# Patient Record
Sex: MALE | Race: BLACK OR AFRICAN AMERICAN | Employment: FULL TIME | URBAN - METROPOLITAN AREA
[De-identification: names, ages, dates, MRNs, and addresses within clinical notes are randomized per-mention and may not be internally consistent; named-entity substitution may affect disease eponyms.]

---

## 2022-04-26 ENCOUNTER — HOSPITAL ENCOUNTER (EMERGENCY)
Age: 57
Discharge: HOME | End: 2022-04-26
Attending: EMERGENCY MEDICINE
Payer: COMMERCIAL

## 2022-04-26 ENCOUNTER — APPOINTMENT (OUTPATIENT)
Dept: GENERAL RADIOLOGY | Age: 57
End: 2022-04-26
Payer: COMMERCIAL

## 2022-04-26 VITALS
OXYGEN SATURATION: 98 % | DIASTOLIC BLOOD PRESSURE: 92 MMHG | RESPIRATION RATE: 21 BRPM | WEIGHT: 271.83 LBS | HEART RATE: 69 BPM | BODY MASS INDEX: 36.03 KG/M2 | SYSTOLIC BLOOD PRESSURE: 151 MMHG | TEMPERATURE: 97.8 F | HEIGHT: 73 IN

## 2022-04-26 DIAGNOSIS — R00.2 PALPITATION: Primary | ICD-10-CM

## 2022-04-26 LAB
ANION GAP SERPL CALCULATED.3IONS-SCNC: 16 MMOL/L (ref 3–16)
BASOPHILS ABSOLUTE: 0 K/UL (ref 0–0.2)
BASOPHILS RELATIVE PERCENT: 0.9 %
BUN BLDV-MCNC: 20 MG/DL (ref 7–20)
CALCIUM SERPL-MCNC: 9 MG/DL (ref 8.3–10.6)
CHLORIDE BLD-SCNC: 100 MMOL/L (ref 99–110)
CO2: 22 MMOL/L (ref 21–32)
CREAT SERPL-MCNC: 0.8 MG/DL (ref 0.9–1.3)
EOSINOPHILS ABSOLUTE: 0.2 K/UL (ref 0–0.6)
EOSINOPHILS RELATIVE PERCENT: 2.9 %
GFR AFRICAN AMERICAN: >60
GFR NON-AFRICAN AMERICAN: >60
GLUCOSE BLD-MCNC: 125 MG/DL (ref 70–99)
HCT VFR BLD CALC: 44.3 % (ref 40.5–52.5)
HEMOGLOBIN: 14.9 G/DL (ref 13.5–17.5)
LYMPHOCYTES ABSOLUTE: 1.5 K/UL (ref 1–5.1)
LYMPHOCYTES RELATIVE PERCENT: 29.9 %
MAGNESIUM: 2.2 MG/DL (ref 1.8–2.4)
MCH RBC QN AUTO: 29.6 PG (ref 26–34)
MCHC RBC AUTO-ENTMCNC: 33.6 G/DL (ref 31–36)
MCV RBC AUTO: 88.1 FL (ref 80–100)
MONOCYTES ABSOLUTE: 0.5 K/UL (ref 0–1.3)
MONOCYTES RELATIVE PERCENT: 9.1 %
NEUTROPHILS ABSOLUTE: 3 K/UL (ref 1.7–7.7)
NEUTROPHILS RELATIVE PERCENT: 57.2 %
PDW BLD-RTO: 14.3 % (ref 12.4–15.4)
PLATELET # BLD: 211 K/UL (ref 135–450)
PMV BLD AUTO: 8.5 FL (ref 5–10.5)
POTASSIUM REFLEX MAGNESIUM: 3.3 MMOL/L (ref 3.5–5.1)
PRO-BNP: 37 PG/ML (ref 0–124)
RBC # BLD: 5.03 M/UL (ref 4.2–5.9)
SODIUM BLD-SCNC: 138 MMOL/L (ref 136–145)
TROPONIN: <0.01 NG/ML
TSH REFLEX: 2.57 UIU/ML (ref 0.27–4.2)
WBC # BLD: 5.2 K/UL (ref 4–11)

## 2022-04-26 PROCEDURE — 83735 ASSAY OF MAGNESIUM: CPT

## 2022-04-26 PROCEDURE — 84484 ASSAY OF TROPONIN QUANT: CPT

## 2022-04-26 PROCEDURE — 83880 ASSAY OF NATRIURETIC PEPTIDE: CPT

## 2022-04-26 PROCEDURE — 99285 EMERGENCY DEPT VISIT HI MDM: CPT

## 2022-04-26 PROCEDURE — 93005 ELECTROCARDIOGRAM TRACING: CPT | Performed by: EMERGENCY MEDICINE

## 2022-04-26 PROCEDURE — 6370000000 HC RX 637 (ALT 250 FOR IP): Performed by: EMERGENCY MEDICINE

## 2022-04-26 PROCEDURE — 85025 COMPLETE CBC W/AUTO DIFF WBC: CPT

## 2022-04-26 PROCEDURE — 80048 BASIC METABOLIC PNL TOTAL CA: CPT

## 2022-04-26 PROCEDURE — 71045 X-RAY EXAM CHEST 1 VIEW: CPT

## 2022-04-26 PROCEDURE — 84443 ASSAY THYROID STIM HORMONE: CPT

## 2022-04-26 RX ORDER — POTASSIUM CHLORIDE 750 MG/1
40 TABLET, FILM COATED, EXTENDED RELEASE ORAL ONCE
Status: COMPLETED | OUTPATIENT
Start: 2022-04-26 | End: 2022-04-26

## 2022-04-26 RX ADMIN — POTASSIUM CHLORIDE 40 MEQ: 750 TABLET, EXTENDED RELEASE ORAL at 05:38

## 2022-04-26 ASSESSMENT — PAIN - FUNCTIONAL ASSESSMENT: PAIN_FUNCTIONAL_ASSESSMENT: NONE - DENIES PAIN

## 2022-04-26 NOTE — ED TRIAGE NOTES
Pt to ED via EMS after waking up to use the restroom and feeling his heart racing. Pt denies chest pain, dizziness, or SOB. Pt is AxOx4, VSS on RA. Pt's wife at bedside.

## 2022-04-27 LAB
EKG ATRIAL RATE: 85 BPM
EKG DIAGNOSIS: NORMAL
EKG P AXIS: 62 DEGREES
EKG P-R INTERVAL: 134 MS
EKG Q-T INTERVAL: 380 MS
EKG QRS DURATION: 74 MS
EKG QTC CALCULATION (BAZETT): 452 MS
EKG R AXIS: -26 DEGREES
EKG T AXIS: 50 DEGREES
EKG VENTRICULAR RATE: 85 BPM

## 2022-04-27 PROCEDURE — 93010 ELECTROCARDIOGRAM REPORT: CPT | Performed by: INTERNAL MEDICINE

## 2022-04-27 ASSESSMENT — ENCOUNTER SYMPTOMS
CONSTIPATION: 0
DIARRHEA: 0
BLOOD IN STOOL: 0
EYE DISCHARGE: 0
BACK PAIN: 0
EYE REDNESS: 0
ANAL BLEEDING: 0
PHOTOPHOBIA: 0
APNEA: 0
EYE PAIN: 0
CHEST TIGHTNESS: 0
VOMITING: 0
ABDOMINAL DISTENTION: 0
NAUSEA: 0
WHEEZING: 0
SHORTNESS OF BREATH: 0
RECTAL PAIN: 0
STRIDOR: 0
ABDOMINAL PAIN: 0
EYE ITCHING: 0
COLOR CHANGE: 0
COUGH: 0
CHOKING: 0

## 2022-04-27 NOTE — ED PROVIDER NOTES
change and pallor. Neurological: Negative for tremors, facial asymmetry and weakness. Hematological: Negative for adenopathy. Does not bruise/bleed easily. Psychiatric/Behavioral: Negative for agitation, behavioral problems, confusion and decreased concentration. Except as noted above the remainder of the review of systems was reviewed and negative. PAST MEDICAL HISTORY   No past medical history on file. SURGICAL HISTORY     No past surgical history on file. CURRENT MEDICATIONS     There are no discharge medications for this patient. ALLERGIES     Patient has no known allergies. FAMILY HISTORY      No family history on file. SOCIAL HISTORY       Social History     Socioeconomic History    Marital status:      Spouse name: Not on file    Number of children: Not on file    Years of education: Not on file    Highest education level: Not on file   Occupational History    Not on file   Tobacco Use    Smoking status: Not on file    Smokeless tobacco: Not on file   Substance and Sexual Activity    Alcohol use: Not on file    Drug use: Not on file    Sexual activity: Not on file   Other Topics Concern    Not on file   Social History Narrative    Not on file     Social Determinants of Health     Financial Resource Strain:     Difficulty of Paying Living Expenses: Not on file   Food Insecurity:     Worried About Running Out of Food in the Last Year: Not on file    Efren of Food in the Last Year: Not on file   Transportation Needs:     Lack of Transportation (Medical): Not on file    Lack of Transportation (Non-Medical):  Not on file   Physical Activity:     Days of Exercise per Week: Not on file    Minutes of Exercise per Session: Not on file   Stress:     Feeling of Stress : Not on file   Social Connections:     Frequency of Communication with Friends and Family: Not on file    Frequency of Social Gatherings with Friends and Family: Not on file    Attends [employer] Services: Not on file    Active Member of Clubs or Organizations: Not on file    Attends Club or Organization Meetings: Not on file    Marital Status: Not on file   Intimate Partner Violence:     Fear of Current or Ex-Partner: Not on file    Emotionally Abused: Not on file    Physically Abused: Not on file    Sexually Abused: Not on file   Housing Stability:     Unable to Pay for Housing in the Last Year: Not on file    Number of [place] in the Last Year: Not on file    Unstable Housing in the Last Year: Not on file       PHYSICAL EXAM       ED Triage Vitals [04/26/22 0422]   BP Temp Temp Source Pulse Resp SpO2 Height Weight   (!) 158/83 98.2 °F (36.8 °C) Oral 88 17 96 % 6' 1\" (1.854 m) 271 lb 13.2 oz (123.3 kg)       Physical Exam  Vitals and nursing note reviewed. Constitutional:       General: He is not in acute distress. Appearance: He is well-developed. He is not diaphoretic. HENT:      Head: Normocephalic and atraumatic. Eyes:      General:         Right eye: No discharge. Left eye: No discharge. Pupils: Pupils are equal, round, and reactive to light. Neck:      Thyroid: No thyromegaly. Trachea: No tracheal deviation. Cardiovascular:      Rate and Rhythm: Normal rate and regular rhythm. Heart sounds: No murmur heard. Pulmonary:      Breath sounds: No wheezing or rales. Chest:      Chest wall: No tenderness. Abdominal:      General: There is no distension. Palpations: Abdomen is soft. There is no mass. Tenderness: There is no abdominal tenderness. There is no guarding or rebound. Musculoskeletal:         General: No tenderness or deformity. Normal range of motion. Cervical back: Normal range of motion. Skin:     General: Skin is warm. Coloration: Skin is not pale. Findings: No erythema or rash. Neurological:      Mental Status: He is alert. Cranial Nerves: No cranial nerve deficit.       Motor: No abnormal muscle tone. Coordination: Coordination normal.         DIAGNOSTIC RESULTS     EKG: All EKG's are interpreted by the Emergency Department Physician who either signs or Co-signs this chart in the absence of acardiologist.    EKG sinus rhythm nonspecific EKG changes    RADIOLOGY:   Non-plain film images such as CT, Ultrasoundand MRI are read by the radiologist. Plain radiographic images are visualized and preliminarily interpreted by the emergency physician with the below findings:    Chest x-ray reassuring    ED BEDSIDE ULTRASOUND:   Performed by ED Physician - none    LABS:  Labs Reviewed   BASIC METABOLIC PANEL W/ REFLEX TO MG FOR LOW K - Abnormal; Notable for the following components:       Result Value    Potassium reflex Magnesium 3.3 (*)     Glucose 125 (*)     CREATININE 0.8 (*)     All other components within normal limits   CBC WITH AUTO DIFFERENTIAL   TROPONIN   BRAIN NATRIURETIC PEPTIDE   TSH WITH REFLEX   MAGNESIUM       All other labs were withinnormal range or not returned as of this dictation. EMERGENCY DEPARTMENT COURSE and DIFFERENTIAL DIAGNOSIS/MDM:     PMH, Surgical Hx, FH, Social Hx reviewed by myself (ETOH usage, Tobacco usage, Drug usage reviewed by myself, no pertinent Hx)- No Pertinent Hx     Old records were reviewed by me    23-year-old with palpitations unclear etiology. Reassuring work-up. Discussed following up with primary care doctor. Holter monitor ordered outpatient. Strict return precautions. I estimate there is LOW risk for Sepsis, MI, Stroke, Tamponade, PTX, Toxicity or other life threatening etiology thus I consider the discharge disposition reasonable. The patient is at low risk for mortality based on demographic, history and clinical factors. Given the best available information and clinical assessment, I estimate the risk of hospitalization to be greater than risk of treatment at home.  I have explained to the patient that the risk could rapidly change, given precautions for return and instructions. Explained to patient that the risk for mortality is low based on demographic, history and clinical factors. I discussed with patient the results of evaluation in the ED, diagnosis, care, and prognosis. The plan is to discharge to home. Patient is in agreement with plan and questions have been answered. I also discussed with patient the reasons which may require a return visit and the importance of follow-up care. The patient is well-appearing, nontoxic, and improved at the time of discharge. Patient agrees to call to arrange follow-up care as directed. Patient understands to return immediately for worsening/change in symptoms. CRITICAL CARE TIME   Total Critical Caretime was 21 minutes, excluding separately reportable procedures. There was a high probability of clinically significant/life threatening deterioration in the patient's condition which required my urgent intervention. PROCEDURES:  Unlessotherwise noted below, none    FINAL IMPRESSION      1.  Palpitation          DISPOSITION/PLAN   DISPOSITION Decision To Discharge 04/26/2022 05:47:56 AM    (Please note that portions ofthis note were completed with a voice recognition program.  Efforts were made to edit the dictations but occasionally words are mis-transcribed.)    Brynn Ro MD(electronically signed)  Attending Emergency Physician        Brynn Ro MD  04/27/22 0005       Brynn Ro MD  04/27/22 [id number]

## 2022-04-29 ENCOUNTER — HOSPITAL ENCOUNTER (OUTPATIENT)
Dept: NON INVASIVE DIAGNOSTICS | Age: 57
Discharge: HOME | End: 2022-04-29
Payer: COMMERCIAL

## 2022-04-29 DIAGNOSIS — R00.2 PALPITATION: ICD-10-CM

## 2022-04-29 PROCEDURE — 93225 XTRNL ECG REC<48 HRS REC: CPT

## 2022-04-29 PROCEDURE — 93226 XTRNL ECG REC<48 HR SCAN A/R: CPT

## 2022-05-05 LAB
ACQUISITION DURATION: NORMAL S
AVERAGE HEART RATE: 84 BPM
EKG DIAGNOSIS: NORMAL
HOLTER MAX HEART RATE: 141 BPM
HOOKUP DATE: NORMAL
HOOKUP TIME: NORMAL
MAX HEART RATE TIME/DATE: NORMAL
MIN HEART RATE TIME/DATE: NORMAL
MIN HEART RATE: 59 BPM
NUMBER OF QRS COMPLEXES: NORMAL
NUMBER OF SUPRAVENTRICULAR COUPLETS: 0
NUMBER OF SUPRAVENTRICULAR ECTOPICS: NORMAL
NUMBER OF SUPRAVENTRICULAR ISOLATED BEATS: NORMAL
NUMBER OF VENTRICULAR BIGEMINAL CYCLES: 0
NUMBER OF VENTRICULAR COUPLETS: 0
NUMBER OF VENTRICULAR ECTOPICS: 55

## 2024-01-07 ENCOUNTER — HOSPITAL ENCOUNTER (INPATIENT)
Age: 59
LOS: 2 days | Discharge: HOME | DRG: 163 | End: 2024-01-09
Attending: INTERNAL MEDICINE | Admitting: INTERNAL MEDICINE
Payer: COMMERCIAL

## 2024-01-07 ENCOUNTER — APPOINTMENT (OUTPATIENT)
Dept: CT IMAGING | Age: 59
DRG: 163 | End: 2024-01-07
Payer: COMMERCIAL

## 2024-01-07 DIAGNOSIS — I26.92 ACUTE SADDLE PULMONARY EMBOLISM WITHOUT ACUTE COR PULMONALE (HCC): Primary | ICD-10-CM

## 2024-01-07 DIAGNOSIS — I26.94 MULTIPLE SUBSEGMENTAL PULMONARY EMBOLI WITHOUT ACUTE COR PULMONALE (HCC): ICD-10-CM

## 2024-01-07 DIAGNOSIS — R06.00 DYSPNEA, UNSPECIFIED TYPE: ICD-10-CM

## 2024-01-07 DIAGNOSIS — R60.0 LEG EDEMA, RIGHT: ICD-10-CM

## 2024-01-07 DIAGNOSIS — R09.02 HYPOXIA: ICD-10-CM

## 2024-01-07 PROBLEM — J96.01 ACUTE HYPOXIC RESPIRATORY FAILURE (HCC): Status: ACTIVE | Noted: 2024-01-07

## 2024-01-07 PROBLEM — I26.99 BILATERAL PULMONARY EMBOLISM (HCC): Status: ACTIVE | Noted: 2024-01-07

## 2024-01-07 LAB
ALBUMIN SERPL-MCNC: 4.2 G/DL (ref 3.4–5)
ALBUMIN/GLOB SERPL: 1.2 {RATIO} (ref 1.1–2.2)
ALP SERPL-CCNC: 107 U/L (ref 40–129)
ALT SERPL-CCNC: 66 U/L (ref 10–40)
ANION GAP SERPL CALCULATED.3IONS-SCNC: 11 MMOL/L (ref 3–16)
APTT BLD: 27.1 SEC (ref 22.7–35.9)
AST SERPL-CCNC: 50 U/L (ref 15–37)
BASOPHILS # BLD: 0 K/UL (ref 0–0.2)
BASOPHILS NFR BLD: 0.3 %
BILIRUB SERPL-MCNC: 1 MG/DL (ref 0–1)
BUN SERPL-MCNC: 12 MG/DL (ref 7–20)
CALCIUM SERPL-MCNC: 9.3 MG/DL (ref 8.3–10.6)
CHLORIDE SERPL-SCNC: 98 MMOL/L (ref 99–110)
CO2 SERPL-SCNC: 23 MMOL/L (ref 21–32)
CREAT SERPL-MCNC: 0.9 MG/DL (ref 0.9–1.3)
DEPRECATED RDW RBC AUTO: 13.9 % (ref 12.4–15.4)
EOSINOPHIL # BLD: 0.3 K/UL (ref 0–0.6)
EOSINOPHIL NFR BLD: 4.9 %
GFR SERPLBLD CREATININE-BSD FMLA CKD-EPI: >60 ML/MIN/{1.73_M2}
GLUCOSE SERPL-MCNC: 131 MG/DL (ref 70–99)
HCT VFR BLD AUTO: 44.3 % (ref 40.5–52.5)
HGB BLD-MCNC: 15 G/DL (ref 13.5–17.5)
INR PPP: 1.1 (ref 0.84–1.16)
LYMPHOCYTES # BLD: 1.2 K/UL (ref 1–5.1)
LYMPHOCYTES NFR BLD: 18.2 %
MAGNESIUM SERPL-MCNC: 2.1 MG/DL (ref 1.8–2.4)
MCH RBC QN AUTO: 30.1 PG (ref 26–34)
MCHC RBC AUTO-ENTMCNC: 33.8 G/DL (ref 31–36)
MCV RBC AUTO: 89 FL (ref 80–100)
MONOCYTES # BLD: 0.6 K/UL (ref 0–1.3)
MONOCYTES NFR BLD: 8.5 %
NEUTROPHILS # BLD: 4.6 K/UL (ref 1.7–7.7)
NEUTROPHILS NFR BLD: 68.1 %
NT-PROBNP SERPL-MCNC: 54 PG/ML (ref 0–124)
PLATELET # BLD AUTO: 109 K/UL (ref 135–450)
PLATELET BLD QL SMEAR: ADEQUATE
PMV BLD AUTO: 9.1 FL (ref 5–10.5)
POTASSIUM SERPL-SCNC: 3.5 MMOL/L (ref 3.5–5.1)
PROT SERPL-MCNC: 7.8 G/DL (ref 6.4–8.2)
PROTHROMBIN TIME: 14.2 SEC (ref 11.5–14.8)
RBC # BLD AUTO: 4.98 M/UL (ref 4.2–5.9)
SODIUM SERPL-SCNC: 132 MMOL/L (ref 136–145)
TROPONIN, HIGH SENSITIVITY: 419 NG/L (ref 0–22)
TROPONIN, HIGH SENSITIVITY: 537 NG/L (ref 0–22)
WBC # BLD AUTO: 6.8 K/UL (ref 4–11)

## 2024-01-07 PROCEDURE — 1200000000 HC SEMI PRIVATE

## 2024-01-07 PROCEDURE — 93005 ELECTROCARDIOGRAM TRACING: CPT | Performed by: PHYSICIAN ASSISTANT

## 2024-01-07 PROCEDURE — 85025 COMPLETE CBC W/AUTO DIFF WBC: CPT

## 2024-01-07 PROCEDURE — 83735 ASSAY OF MAGNESIUM: CPT

## 2024-01-07 PROCEDURE — 80053 COMPREHEN METABOLIC PANEL: CPT

## 2024-01-07 PROCEDURE — 71260 CT THORAX DX C+: CPT

## 2024-01-07 PROCEDURE — 6360000004 HC RX CONTRAST MEDICATION: Performed by: PHYSICIAN ASSISTANT

## 2024-01-07 PROCEDURE — 83880 ASSAY OF NATRIURETIC PEPTIDE: CPT

## 2024-01-07 PROCEDURE — 85610 PROTHROMBIN TIME: CPT

## 2024-01-07 PROCEDURE — 96375 TX/PRO/DX INJ NEW DRUG ADDON: CPT

## 2024-01-07 PROCEDURE — 85730 THROMBOPLASTIN TIME PARTIAL: CPT

## 2024-01-07 PROCEDURE — 99285 EMERGENCY DEPT VISIT HI MDM: CPT

## 2024-01-07 PROCEDURE — 36415 COLL VENOUS BLD VENIPUNCTURE: CPT

## 2024-01-07 PROCEDURE — 84484 ASSAY OF TROPONIN QUANT: CPT

## 2024-01-07 PROCEDURE — 2060000000 HC ICU INTERMEDIATE R&B

## 2024-01-07 PROCEDURE — 6360000002 HC RX W HCPCS: Performed by: PHYSICIAN ASSISTANT

## 2024-01-07 PROCEDURE — 96365 THER/PROPH/DIAG IV INF INIT: CPT

## 2024-01-07 PROCEDURE — 2500000003 HC RX 250 WO HCPCS: Performed by: PHYSICIAN ASSISTANT

## 2024-01-07 RX ORDER — HEPARIN SODIUM 1000 [USP'U]/ML
10000 INJECTION, SOLUTION INTRAVENOUS; SUBCUTANEOUS ONCE
Status: COMPLETED | OUTPATIENT
Start: 2024-01-07 | End: 2024-01-07

## 2024-01-07 RX ORDER — HEPARIN SODIUM 10000 [USP'U]/100ML
0-4000 INJECTION, SOLUTION INTRAVENOUS CONTINUOUS
Status: DISPENSED | OUTPATIENT
Start: 2024-01-07 | End: 2024-01-09

## 2024-01-07 RX ORDER — HEPARIN SODIUM 1000 [USP'U]/ML
10000 INJECTION, SOLUTION INTRAVENOUS; SUBCUTANEOUS PRN
Status: DISCONTINUED | OUTPATIENT
Start: 2024-01-07 | End: 2024-01-07

## 2024-01-07 RX ORDER — HEPARIN SODIUM 1000 [USP'U]/ML
5000 INJECTION, SOLUTION INTRAVENOUS; SUBCUTANEOUS PRN
Status: DISCONTINUED | OUTPATIENT
Start: 2024-01-07 | End: 2024-01-07

## 2024-01-07 RX ADMIN — HEPARIN SODIUM 10000 UNITS: 1000 INJECTION INTRAVENOUS; SUBCUTANEOUS at 22:51

## 2024-01-07 RX ADMIN — HEPARIN SODIUM 2100 UNITS/HR: 10000 INJECTION, SOLUTION INTRAVENOUS at 22:53

## 2024-01-07 RX ADMIN — IOPAMIDOL 75 ML: 755 INJECTION, SOLUTION INTRAVENOUS at 21:21

## 2024-01-07 ASSESSMENT — PAIN - FUNCTIONAL ASSESSMENT: PAIN_FUNCTIONAL_ASSESSMENT: 0-10

## 2024-01-07 ASSESSMENT — PAIN SCALES - GENERAL: PAINLEVEL_OUTOF10: 0

## 2024-01-08 ENCOUNTER — APPOINTMENT (OUTPATIENT)
Dept: CARDIAC CATH/INVASIVE PROCEDURES | Age: 59
DRG: 163 | End: 2024-01-08
Payer: COMMERCIAL

## 2024-01-08 LAB
ALBUMIN SERPL-MCNC: 3.6 G/DL (ref 3.4–5)
ALP SERPL-CCNC: 96 U/L (ref 40–129)
ALT SERPL-CCNC: 56 U/L (ref 10–40)
ANION GAP SERPL CALCULATED.3IONS-SCNC: 12 MMOL/L (ref 3–16)
ANTI-XA UNFRAC HEPARIN: 0.21 IU/ML (ref 0.3–0.7)
ANTI-XA UNFRAC HEPARIN: 1.04 IU/ML (ref 0.3–0.7)
ANTI-XA UNFRAC HEPARIN: >1.1 IU/ML (ref 0.3–0.7)
AST SERPL-CCNC: 37 U/L (ref 15–37)
BASOPHILS # BLD: 0 K/UL (ref 0–0.2)
BASOPHILS NFR BLD: 0.5 %
BILIRUB DIRECT SERPL-MCNC: 0.3 MG/DL (ref 0–0.3)
BILIRUB INDIRECT SERPL-MCNC: 0.7 MG/DL (ref 0–1)
BILIRUB SERPL-MCNC: 1 MG/DL (ref 0–1)
BUN SERPL-MCNC: 10 MG/DL (ref 7–20)
CALCIUM SERPL-MCNC: 8.7 MG/DL (ref 8.3–10.6)
CHLORIDE SERPL-SCNC: 103 MMOL/L (ref 99–110)
CO2 SERPL-SCNC: 21 MMOL/L (ref 21–32)
CREAT SERPL-MCNC: 0.9 MG/DL (ref 0.9–1.3)
DEPRECATED RDW RBC AUTO: 14.1 % (ref 12.4–15.4)
EKG ATRIAL RATE: 115 BPM
EKG DIAGNOSIS: NORMAL
EKG P AXIS: 35 DEGREES
EKG P-R INTERVAL: 138 MS
EKG Q-T INTERVAL: 360 MS
EKG QRS DURATION: 124 MS
EKG QTC CALCULATION (BAZETT): 498 MS
EKG R AXIS: 41 DEGREES
EKG T AXIS: -21 DEGREES
EKG VENTRICULAR RATE: 115 BPM
EOSINOPHIL # BLD: 0.2 K/UL (ref 0–0.6)
EOSINOPHIL NFR BLD: 3 %
GFR SERPLBLD CREATININE-BSD FMLA CKD-EPI: >60 ML/MIN/{1.73_M2}
GLUCOSE SERPL-MCNC: 115 MG/DL (ref 70–99)
HCT VFR BLD AUTO: 43.1 % (ref 40.5–52.5)
HGB BLD-MCNC: 14.7 G/DL (ref 13.5–17.5)
LYMPHOCYTES # BLD: 2.1 K/UL (ref 1–5.1)
LYMPHOCYTES NFR BLD: 31.2 %
MCH RBC QN AUTO: 30 PG (ref 26–34)
MCHC RBC AUTO-ENTMCNC: 34.1 G/DL (ref 31–36)
MCV RBC AUTO: 88.1 FL (ref 80–100)
MONOCYTES # BLD: 0.8 K/UL (ref 0–1.3)
MONOCYTES NFR BLD: 11.1 %
NEUTROPHILS # BLD: 3.7 K/UL (ref 1.7–7.7)
NEUTROPHILS NFR BLD: 54.2 %
PLATELET # BLD AUTO: 118 K/UL (ref 135–450)
PMV BLD AUTO: 8.7 FL (ref 5–10.5)
POC ACT LR: 219 SEC
POC ACT LR: 227 SEC
POTASSIUM SERPL-SCNC: 3.9 MMOL/L (ref 3.5–5.1)
PROT SERPL-MCNC: 7.5 G/DL (ref 6.4–8.2)
RBC # BLD AUTO: 4.9 M/UL (ref 4.2–5.9)
SODIUM SERPL-SCNC: 136 MMOL/L (ref 136–145)
WBC # BLD AUTO: 6.8 K/UL (ref 4–11)

## 2024-01-08 PROCEDURE — 85520 HEPARIN ASSAY: CPT

## 2024-01-08 PROCEDURE — 02CQ3ZZ EXTIRPATION OF MATTER FROM RIGHT PULMONARY ARTERY, PERCUTANEOUS APPROACH: ICD-10-PCS | Performed by: INTERNAL MEDICINE

## 2024-01-08 PROCEDURE — 93970 EXTREMITY STUDY: CPT

## 2024-01-08 PROCEDURE — 99223 1ST HOSP IP/OBS HIGH 75: CPT | Performed by: INTERNAL MEDICINE

## 2024-01-08 PROCEDURE — C1760 CLOSURE DEV, VASC: HCPCS | Performed by: INTERNAL MEDICINE

## 2024-01-08 PROCEDURE — 2500000003 HC RX 250 WO HCPCS

## 2024-01-08 PROCEDURE — 85347 COAGULATION TIME ACTIVATED: CPT

## 2024-01-08 PROCEDURE — 2580000003 HC RX 258: Performed by: INTERNAL MEDICINE

## 2024-01-08 PROCEDURE — 2060000000 HC ICU INTERMEDIATE R&B

## 2024-01-08 PROCEDURE — 99152 MOD SED SAME PHYS/QHP 5/>YRS: CPT

## 2024-01-08 PROCEDURE — 6360000002 HC RX W HCPCS: Performed by: PHYSICIAN ASSISTANT

## 2024-01-08 PROCEDURE — 36415 COLL VENOUS BLD VENIPUNCTURE: CPT

## 2024-01-08 PROCEDURE — C1894 INTRO/SHEATH, NON-LASER: HCPCS | Performed by: INTERNAL MEDICINE

## 2024-01-08 PROCEDURE — 36014 PLACE CATHETER IN ARTERY: CPT

## 2024-01-08 PROCEDURE — 6360000004 HC RX CONTRAST MEDICATION: Performed by: INTERNAL MEDICINE

## 2024-01-08 PROCEDURE — 80076 HEPATIC FUNCTION PANEL: CPT

## 2024-01-08 PROCEDURE — C1753 CATH, INTRAVAS ULTRASOUND: HCPCS | Performed by: INTERNAL MEDICINE

## 2024-01-08 PROCEDURE — 80048 BASIC METABOLIC PNL TOTAL CA: CPT

## 2024-01-08 PROCEDURE — 99222 1ST HOSP IP/OBS MODERATE 55: CPT | Performed by: INTERNAL MEDICINE

## 2024-01-08 PROCEDURE — 93010 ELECTROCARDIOGRAM REPORT: CPT | Performed by: INTERNAL MEDICINE

## 2024-01-08 PROCEDURE — 85025 COMPLETE CBC W/AUTO DIFF WBC: CPT

## 2024-01-08 PROCEDURE — 02CR3ZZ EXTIRPATION OF MATTER FROM LEFT PULMONARY ARTERY, PERCUTANEOUS APPROACH: ICD-10-PCS | Performed by: INTERNAL MEDICINE

## 2024-01-08 PROCEDURE — 2500000003 HC RX 250 WO HCPCS: Performed by: PHYSICIAN ASSISTANT

## 2024-01-08 PROCEDURE — C1769 GUIDE WIRE: HCPCS | Performed by: INTERNAL MEDICINE

## 2024-01-08 PROCEDURE — 6360000002 HC RX W HCPCS

## 2024-01-08 PROCEDURE — 2720000010 HC SURG SUPPLY STERILE: Performed by: INTERNAL MEDICINE

## 2024-01-08 PROCEDURE — 37184 PRIM ART M-THRMBC 1ST VSL: CPT

## 2024-01-08 PROCEDURE — 2709999900 HC NON-CHARGEABLE SUPPLY: Performed by: INTERNAL MEDICINE

## 2024-01-08 PROCEDURE — 99153 MOD SED SAME PHYS/QHP EA: CPT

## 2024-01-08 RX ORDER — SODIUM CHLORIDE 0.9 % (FLUSH) 0.9 %
5-40 SYRINGE (ML) INJECTION PRN
Status: DISCONTINUED | OUTPATIENT
Start: 2024-01-08 | End: 2024-01-08

## 2024-01-08 RX ORDER — MORPHINE SULFATE 2 MG/ML
2 INJECTION, SOLUTION INTRAMUSCULAR; INTRAVENOUS EVERY 4 HOURS PRN
Status: DISCONTINUED | OUTPATIENT
Start: 2024-01-08 | End: 2024-01-09 | Stop reason: HOSPADM

## 2024-01-08 RX ORDER — LANOLIN ALCOHOL/MO/W.PET/CERES
3 CREAM (GRAM) TOPICAL NIGHTLY PRN
Status: DISCONTINUED | OUTPATIENT
Start: 2024-01-08 | End: 2024-01-09 | Stop reason: HOSPADM

## 2024-01-08 RX ORDER — POTASSIUM CHLORIDE 7.45 MG/ML
10 INJECTION INTRAVENOUS PRN
Status: DISCONTINUED | OUTPATIENT
Start: 2024-01-08 | End: 2024-01-09 | Stop reason: HOSPADM

## 2024-01-08 RX ORDER — POTASSIUM CHLORIDE 20 MEQ/1
40 TABLET, EXTENDED RELEASE ORAL PRN
Status: DISCONTINUED | OUTPATIENT
Start: 2024-01-08 | End: 2024-01-09 | Stop reason: HOSPADM

## 2024-01-08 RX ORDER — SODIUM CHLORIDE 0.9 % (FLUSH) 0.9 %
5-40 SYRINGE (ML) INJECTION PRN
Status: DISCONTINUED | OUTPATIENT
Start: 2024-01-08 | End: 2024-01-09 | Stop reason: HOSPADM

## 2024-01-08 RX ORDER — ONDANSETRON 2 MG/ML
4 INJECTION INTRAMUSCULAR; INTRAVENOUS EVERY 6 HOURS PRN
Status: DISCONTINUED | OUTPATIENT
Start: 2024-01-08 | End: 2024-01-09 | Stop reason: HOSPADM

## 2024-01-08 RX ORDER — SODIUM CHLORIDE 0.9 % (FLUSH) 0.9 %
5-40 SYRINGE (ML) INJECTION EVERY 12 HOURS SCHEDULED
Status: DISCONTINUED | OUTPATIENT
Start: 2024-01-08 | End: 2024-01-09 | Stop reason: HOSPADM

## 2024-01-08 RX ORDER — ACETAMINOPHEN 325 MG/1
650 TABLET ORAL EVERY 4 HOURS PRN
Status: DISCONTINUED | OUTPATIENT
Start: 2024-01-08 | End: 2024-01-09 | Stop reason: HOSPADM

## 2024-01-08 RX ORDER — SODIUM CHLORIDE 0.9 % (FLUSH) 0.9 %
5-40 SYRINGE (ML) INJECTION EVERY 12 HOURS SCHEDULED
Status: DISCONTINUED | OUTPATIENT
Start: 2024-01-08 | End: 2024-01-08

## 2024-01-08 RX ORDER — HEPARIN SODIUM 1000 [USP'U]/ML
40 INJECTION, SOLUTION INTRAVENOUS; SUBCUTANEOUS PRN
Status: DISCONTINUED | OUTPATIENT
Start: 2024-01-08 | End: 2024-01-08 | Stop reason: SDUPTHER

## 2024-01-08 RX ORDER — METOPROLOL SUCCINATE 50 MG/1
50 TABLET, EXTENDED RELEASE ORAL DAILY
COMMUNITY

## 2024-01-08 RX ORDER — ACETAMINOPHEN 325 MG/1
650 TABLET ORAL EVERY 6 HOURS PRN
Status: DISCONTINUED | OUTPATIENT
Start: 2024-01-08 | End: 2024-01-08 | Stop reason: SDUPTHER

## 2024-01-08 RX ORDER — LISINOPRIL AND HYDROCHLOROTHIAZIDE 25; 20 MG/1; MG/1
1 TABLET ORAL DAILY
COMMUNITY

## 2024-01-08 RX ORDER — HEPARIN SODIUM 10000 [USP'U]/100ML
5-30 INJECTION, SOLUTION INTRAVENOUS CONTINUOUS
Status: DISCONTINUED | OUTPATIENT
Start: 2024-01-08 | End: 2024-01-08 | Stop reason: SDUPTHER

## 2024-01-08 RX ORDER — HEPARIN SODIUM 1000 [USP'U]/ML
80 INJECTION, SOLUTION INTRAVENOUS; SUBCUTANEOUS ONCE
Status: DISCONTINUED | OUTPATIENT
Start: 2024-01-08 | End: 2024-01-08 | Stop reason: SDUPTHER

## 2024-01-08 RX ORDER — ACETAMINOPHEN 650 MG/1
650 SUPPOSITORY RECTAL EVERY 6 HOURS PRN
Status: DISCONTINUED | OUTPATIENT
Start: 2024-01-08 | End: 2024-01-09 | Stop reason: HOSPADM

## 2024-01-08 RX ORDER — SODIUM CHLORIDE 9 MG/ML
INJECTION, SOLUTION INTRAVENOUS PRN
Status: DISCONTINUED | OUTPATIENT
Start: 2024-01-08 | End: 2024-01-08

## 2024-01-08 RX ORDER — LORAZEPAM 0.5 MG/1
0.5 TABLET ORAL EVERY 4 HOURS PRN
Status: DISCONTINUED | OUTPATIENT
Start: 2024-01-08 | End: 2024-01-09 | Stop reason: HOSPADM

## 2024-01-08 RX ORDER — POLYETHYLENE GLYCOL 3350 17 G/17G
17 POWDER, FOR SOLUTION ORAL DAILY PRN
Status: DISCONTINUED | OUTPATIENT
Start: 2024-01-08 | End: 2024-01-09 | Stop reason: HOSPADM

## 2024-01-08 RX ORDER — HEPARIN SODIUM 1000 [USP'U]/ML
80 INJECTION, SOLUTION INTRAVENOUS; SUBCUTANEOUS PRN
Status: DISCONTINUED | OUTPATIENT
Start: 2024-01-08 | End: 2024-01-08 | Stop reason: SDUPTHER

## 2024-01-08 RX ORDER — MAGNESIUM SULFATE IN WATER 40 MG/ML
2000 INJECTION, SOLUTION INTRAVENOUS PRN
Status: DISCONTINUED | OUTPATIENT
Start: 2024-01-08 | End: 2024-01-09 | Stop reason: HOSPADM

## 2024-01-08 RX ORDER — ONDANSETRON 4 MG/1
4 TABLET, ORALLY DISINTEGRATING ORAL EVERY 8 HOURS PRN
Status: DISCONTINUED | OUTPATIENT
Start: 2024-01-08 | End: 2024-01-09 | Stop reason: HOSPADM

## 2024-01-08 RX ORDER — SODIUM CHLORIDE 9 MG/ML
INJECTION, SOLUTION INTRAVENOUS PRN
Status: DISCONTINUED | OUTPATIENT
Start: 2024-01-08 | End: 2024-01-09 | Stop reason: HOSPADM

## 2024-01-08 RX ORDER — HEPARIN SODIUM 1000 [USP'U]/ML
5000 INJECTION, SOLUTION INTRAVENOUS; SUBCUTANEOUS ONCE
Status: COMPLETED | OUTPATIENT
Start: 2024-01-08 | End: 2024-01-08

## 2024-01-08 RX ORDER — NIFEDIPINE 90 MG/1
90 TABLET, FILM COATED, EXTENDED RELEASE ORAL DAILY
COMMUNITY

## 2024-01-08 RX ADMIN — HEPARIN SODIUM 1970 UNITS/HR: 10000 INJECTION, SOLUTION INTRAVENOUS at 16:27

## 2024-01-08 RX ADMIN — HEPARIN SODIUM 5000 UNITS: 1000 INJECTION INTRAVENOUS; SUBCUTANEOUS at 16:22

## 2024-01-08 RX ADMIN — IOPAMIDOL 65 ML: 755 INJECTION, SOLUTION INTRAVENOUS at 08:42

## 2024-01-08 RX ADMIN — Medication 10 ML: at 19:47

## 2024-01-08 NOTE — CONSULTS
REASON FOR CONSULTATION/CC: pe      Consult at request of David Finnegan MD for pe    PCP: David Finnegan MD  Established Pulmonologist:  None    HISTORY OF PRESENT ILLNESS: Jemal Oglesby is a 58 y.o. year old male with a history of  who presents with       Patient was admitted with increased shortness of breath found to have bilateral pulmonary embolism.  Taken to thrombectomy this morning with heparin drip.  Initially needed 6 L nasal cannula.  Weaned to room air after the procedure.    This note may have been  transcribed using Dragon Dictation software. Please disregard any translational errors.      Assessment:          Plan:      Hospital Day 1     Hypoxemia secondary to pulmonary embolism  Resolved with thrombectomy    Bilateral pulmonary embolism  Agree with treatment.  Defer to cardiology.  Sign off at this time.  Thank for the consultation.               This note was transcribed using Dragon Dictation software. Please disregard any translational errors.    Thank you for the consult    EVON BOLIVAR MALDONADO   [facility]  [phone]             Data:     PAST MEDICAL HISTORY:  History reviewed. No pertinent past medical history.    PAST SURGICAL HISTORY:  History reviewed. No pertinent surgical history.    FAMILY HISTORY:  family history is not on file.    SOCIAL HISTORY:   reports that he has never smoked. He has never used smokeless tobacco.    Scheduled Meds:   sodium chloride flush  5-40 mL IntraVENous 2 times per day       Continuous Infusions:   sodium chloride      heparin (PORCINE) Infusion 1,720 Units/hr (01/08/24 1133)       PRN Meds:  perflutren lipid microspheres, morphine, LORazepam, potassium chloride **OR** potassium alternative oral replacement **OR** potassium chloride, magnesium sulfate, ondansetron **OR** ondansetron, polyethylene glycol, [DISCONTINUED] acetaminophen **OR** acetaminophen, melatonin, sodium chloride flush, sodium chloride,

## 2024-01-08 NOTE — CONSULTS
Clinical Pharmacy Note  Heparin Dosing Consult    Jemal Oglesby is a 58 y.o. male ordered heparin per VTE/DVT/PE Nomogram by Dr. Finnegan.     Lab Results   Component Value Date/Time    APTT 27.1 01/07/2024 09:59 PM     Lab Results   Component Value Date/Time    HGB 15.0 01/07/2024 09:59 PM    HCT 44.3 01/07/2024 09:59 PM     01/07/2024 09:59 PM    INR 1.10 01/07/2024 09:59 PM       Ht Readings from Last 1 Encounters:   01/07/24 1.854 m (6' 1\")        Wt Readings from Last 1 Encounters:   01/07/24 126.6 kg (279 lb)        Assessment/Plan:  Initial bolus: 10,000 units  Initial infusion rate: 2100 units/hr  Next anti-Xa: 0500 on 1/8    Pharmacy will continue to monitor adjust heparin based on anti-Xa results using nomogram below:     VTE/DVT/PE Heparin Nomogram     Initial Bolus: 80 units/kg Max Bolus: 10,000 units       Initial Rate: 18 units/kg/hr Max Initial Rate: 2,100 units/hr     anti-Xa Bolus Titration   < 0.1 Heparin 80 units/kg bolus Increase drip by 4 units/kg/hr   0.1 - 0.29 Heparin 40 units/kg bolus Increase drip by 2 units/kg/hr   0.3 - 0.7 No Bolus No Change   0.71 - 0.8 No Bolus Decrease drip by 1 units/kg/hr   0.81 - 0.99 No Bolus Decrease drip by 2 units/kg/hr   > 1 Hold Heparin for 1 hour Decrease drip by 3 units/kg/hr       Obtain anti-Xa 6 hours after initial bolus and 6 hours after any dose change until two consecutive therapeutic anti-Xa levels are achieved - then daily.

## 2024-01-08 NOTE — ED NOTES
ED SBAR report provider to SARAH Renteria. Patient to be transported to Room 5268 via stretcher by RN.Patient transported with bedside cardiac monitor and with IV medications infusing. IV site clean, dry, and intact. Updated patient and family on plan of care.

## 2024-01-08 NOTE — OP NOTE
[facility]   Procedure Note    CLINICAL HISTORY:       Jemal Oglesby is a 58 y.o. male with saddle PE with cor pulmonale     Patient Active Problem List   Diagnosis    Saddle embolus of pulmonary artery without acute cor pulmonale (HCC)    Acute hypoxic respiratory failure (HCC)    Bilateral pulmonary embolism (HCC)     The risks, benefits, and details of the procedure were explained to the patient.  The patient verbalized understanding and wanted to proceed.  Informed written consent was obtained.    INDICATION:  Submassive PE with cor pulmonale     PROCEDURES PERFORMED:   Bilateral aspiration thrombectomy   Bilateral pulmonary angiogram     PROCEDURE TECHNIQUE:  The patient was approached from the right femoral vein using ultrasound micro access techniques placing a 6 Uzbek sheath     CONTRAST:  Total of 80 cc.    COMPLICATIONS:  None.      EBL: 5 cc    INTERVENTION  Using an 035 Amplatz wire the sheath was upsized to a 24 U[place]bek [place] dry seal sheath  Using a pigtail catheter and a soft Glidewire the right pulmonary artery successfully engaged and that wire was then exchanged for a 035 [place]. Amplatz wire a 24 [place]zbek aspiration catheter was positioned in the right lower lobe and 4 aspirations were performed a selective right pulmonary angiogram showed complete thrombus resolution  The aspiration catheter was then moved to the left lower lobe over an 035 wire and additional for aspiration was then performed repeat selective left pulmonary angiogram shows complete thrombus resolution      Pulmonary Angiogram:  Selective left and right pulm angiogram performed after intervention showed complete thrombus resolution        SUMMARY:   Successful bilateral pulmonary aspiration thrombectomy      RECOMMENDATION:  .    - c/w heparin gtt until AM   - transition to DOAC  - anticipate d/c home tomorrow     Mina Navarro MD FAC  General, Interventional Cardiology, and Peripheral Vascular Disease   [facility]

## 2024-01-08 NOTE — CONSULTS
Interventional Cardiology Consultation     Jemal Oglesby  1965    PCP: David Finnegan MD  Referring Physician: [facility]   Reason for Referral: Pulmonary embolism  Chief Complaint:   Chief Complaint   Patient presents with    Shortness of Breath     Sudden onset, no resp hx.  Hypoxic for EMS, arrived on O2       Subjective:     History of Present Illness: The patient is 58 y.o. male with a past medical history significant for hypertension presents with above complaint.  He admits to 1 week of worsening shortness of breath.  He also admits to right calf discomfort.  He is a  with no history of similar complaints.  Due to his worsening shortness of breath he presented to the emergency was Hospital for further workup.  CT pulm angiogram is consistent with a saddle pulm embolism.  He is hemodynamically stable.  His cardiac biomarkers are all negative.  He currently denies chest pain PND orthopnea palpitations or syncope      History reviewed. No pertinent past medical history.  History reviewed. No pertinent surgical history.  History reviewed. No pertinent family history.  Social History     Tobacco Use    Smoking status: Never    Smokeless tobacco: Never   Substance Use Topics    Alcohol use: Yes     Comment: social    Drug use: Never       No Known Allergies    Current Facility-Administered Medications   Medication Dose Route Frequency Provider Last Rate Last Admin    heparin 25,000 unit in sodium chloride 0.45% 250 mL (premix) infusion  0-4,000 Units/hr IntraVENous Continuous Nancy Black PA-C 21 mL/hr at 01/07/24 2253 2,100 Units/hr at 01/07/24 2253     No current outpatient medications on file.       Review of Systems:  Constitutional: No unanticipated weight loss. There's been no change in energy level, sleep pattern, or activity level. No fevers, chills.   Eyes: No visual changes or diplopia. No scleral icterus.  ENT: No Headaches, hearing loss or vertigo. No mouth

## 2024-01-08 NOTE — ED PROVIDER NOTES
[facility] EMERGENCY DEPARTMENT  EMERGENCY DEPARTMENT ENCOUNTER        Pt Name: Jemal Oglesby  MRN: [id number]  Birthdate 1965  Date of evaluation: 1/7/2024  Provider: Nancy Black PA-C  PCP: David Finnegan MD  Note Started: 1:57 AM EST 1/8/24      KARINA. I have evaluated this patient.        CHIEF COMPLAINT       Chief Complaint   Patient presents with    Shortness of Breath     Sudden onset, no resp hx.  Hypoxic for EMS, arrived on O2       HISTORY OF PRESENT ILLNESS: 1 or more Elements             Jemal Oglesby is a 58 y.o. male who presents to the emergency department with complaint of sudden onset of shortness of breath that started earlier today.  States that he was just walking in his house when the shortness of breath started.  He did not have any associated chest pain.  He comes in by EMS for further evaluation.  He does endorse over the past week or so he has developed right calf pain.  He did not think much of it.  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  He denies any fevers, cough.    Nursing Notes were all reviewed and agreed with or any disagreements were addressed in the HPI.    REVIEW OF SYSTEMS :      Review of Systems   All other systems reviewed and are negative.      Positives and Pertinent negatives as per HPI.     SURGICAL HISTORY   History reviewed. No pertinent surgical history.    CURRENTMEDICATIONS       Previous Medications    No medications on file       ALLERGIES     Patient has no known allergies.    FAMILYHISTORY     History reviewed. No pertinent family history.     SOCIAL HISTORY       Social History     Tobacco Use    Smoking status: Never    Smokeless tobacco: Never   Substance Use Topics    Alcohol use: Yes     Comment: social    Drug use: Never       SCREENINGS        Berta Coma Scale  Eye Opening: Spontaneous  Best Verbal Response: Oriented  Best Motor Response: Obeys commands  Onida Coma Scale

## 2024-01-08 NOTE — ED TRIAGE NOTES
Patient to ED via EMS for shortness of breath and leg pain.  Patient is alert and oriented with GCS 15.  Patient complains of sudden onset of shortness of breath, EMS reports hypoxic at scene and placed on O2 by NRB.  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  Patient denies any chest pain with episode.  Patient switched to NC

## 2024-01-08 NOTE — PRE SEDATION
Sedation Pre-Procedure Note    Patient Name: Jemal Oglesby   YOB: 1965  Room/Bed: D1Z-0791/5268-01  Medical Record Number: [id number]  Date: 1/8/2024   Time: 10:37 AM       Indication:  saddle PE     Consent: I have discussed with the patient and/or the patient representative the indication, alternatives, and the possible risks and/or complications of the planned procedure and the anesthesia methods. The patient and/or patient representative appear to understand and agree to proceed.    Vital Signs:   Vitals:    01/08/24 1015   BP:    Pulse: 93   Resp:    Temp:    SpO2: 95%       Past Medical History:   has no past medical history on file.    Past Surgical History:   has no past surgical history on file.    Medications:   Scheduled Meds:    sodium chloride flush  5-40 mL IntraVENous 2 times per day     Continuous Infusions:    sodium chloride      heparin (PORCINE) Infusion 2,100 Units/hr (01/08/24 0616)     PRN Meds: perflutren lipid microspheres, morphine, LORazepam, sodium chloride flush, sodium chloride, potassium chloride **OR** potassium alternative oral replacement **OR** potassium chloride, magnesium sulfate, ondansetron **OR** ondansetron, polyethylene glycol, acetaminophen **OR** acetaminophen, melatonin  Home Meds:   Prior to Admission medications    Medication Sig Start Date End Date Taking? Authorizing Provider   lisinopril-hydroCHLOROthiazide (PRINZIDE;ZESTORETIC) 20-25 MG per tablet Take 1 tablet by mouth daily   Yes ProviderNena MD   metoprolol succinate (TOPROL XL) 50 MG extended release tablet Take 1 tablet by mouth daily   Yes ProviderNena MD   NIFEdipine (ADALAT CC) 90 MG extended release tablet Take 1 tablet by mouth daily   Yes ProviderNena MD     Coumadin Use Last 7 Days:  no  Antiplatelet drug therapy use last 7 days: no  Other anticoagulant use last 7 days: no  Additional Medication Information:  n/a      Pre-Sedation Documentation and Exam:   I have

## 2024-01-08 NOTE — H&P
[facility]           [address] [place] [address]                              HISTORY AND PHYSICAL    PATIENT NAME: CEDRICK VUONG                     :        1965  MED REC NO:   [id number]                          ROOM:  ACCOUNT NO:   [id number]                           ADMIT DATE: 2024  PROVIDER:     David Finnegan MD    Patient Dr. Finnegan.    CHIEF COMPLAINT:  \"Could not breathe.\"    HISTORY OF PRESENT ILLNESS:  The patient is generally healthy  hypertensive 58-year-old  .  The patient is  in general good health, except for hypertension for about a week prior  to this admission.  The patient noted some pain in his right calf, which  he assumed was muscular ligamentous brain.  On the evening of admission,  the patient had the sudden onset of shortness of breath and anxiety.  No  chest pain or palpitations.  911 was called.  EMS found him to be  hypoxic.  Rebreather bag was placed and the patient was transported to  the emergency room at [facility].  Emergency room evaluation revealed he  was hypoxic and required 6 liters of nasal oxygen.  He was tachycardic  and afebrile.  CT scan of the chest showed a saddle pulmonary embolus as  well as peripheral emboli.  Right ventricle appeared to be slightly  enlarged.  The patient was bolused with intravenous heparin.  He was  begun on heparin drip.  Obviously, admission was felt to be necessary.   I was called and agreed to admit the patient to [facility] [place]. Navarro was  called and was to see the patient for further evaluation.    PAST MEDICAL HISTORY:  The patient has a past medical history of  hypertension.  No past medical history of any sort of clotting disorder.    FAMILY HISTORY:  Pertinent and that the patient's mother had blood  clots, but this appeared to occur following a surgical procedure.    SOCIAL HISTORY:  The patient is a nonsmoker.  Occasional

## 2024-01-09 VITALS
HEART RATE: 69 BPM | WEIGHT: 281.09 LBS | BODY MASS INDEX: 37.25 KG/M2 | RESPIRATION RATE: 16 BRPM | DIASTOLIC BLOOD PRESSURE: 80 MMHG | TEMPERATURE: 98 F | OXYGEN SATURATION: 98 % | SYSTOLIC BLOOD PRESSURE: 124 MMHG | HEIGHT: 73 IN

## 2024-01-09 LAB
ALBUMIN SERPL-MCNC: 3.6 G/DL (ref 3.4–5)
ALP SERPL-CCNC: 89 U/L (ref 40–129)
ALT SERPL-CCNC: 50 U/L (ref 10–40)
ANION GAP SERPL CALCULATED.3IONS-SCNC: 9 MMOL/L (ref 3–16)
ANTI-XA UNFRAC HEPARIN: 0.63 IU/ML (ref 0.3–0.7)
AST SERPL-CCNC: 34 U/L (ref 15–37)
BASOPHILS # BLD: 0 K/UL (ref 0–0.2)
BASOPHILS NFR BLD: 0.6 %
BILIRUB DIRECT SERPL-MCNC: <0.2 MG/DL (ref 0–0.3)
BILIRUB INDIRECT SERPL-MCNC: ABNORMAL MG/DL (ref 0–1)
BILIRUB SERPL-MCNC: 0.8 MG/DL (ref 0–1)
BUN SERPL-MCNC: 11 MG/DL (ref 7–20)
CALCIUM SERPL-MCNC: 8.6 MG/DL (ref 8.3–10.6)
CHLORIDE SERPL-SCNC: 105 MMOL/L (ref 99–110)
CO2 SERPL-SCNC: 22 MMOL/L (ref 21–32)
CREAT SERPL-MCNC: 0.8 MG/DL (ref 0.9–1.3)
DEPRECATED RDW RBC AUTO: 13.9 % (ref 12.4–15.4)
EKG ATRIAL RATE: 72 BPM
EKG DIAGNOSIS: NORMAL
EKG P AXIS: 23 DEGREES
EKG P-R INTERVAL: 130 MS
EKG Q-T INTERVAL: 450 MS
EKG QRS DURATION: 78 MS
EKG QTC CALCULATION (BAZETT): 492 MS
EKG R AXIS: -19 DEGREES
EKG T AXIS: -10 DEGREES
EKG VENTRICULAR RATE: 72 BPM
EOSINOPHIL # BLD: 0.3 K/UL (ref 0–0.6)
EOSINOPHIL NFR BLD: 4.9 %
GFR SERPLBLD CREATININE-BSD FMLA CKD-EPI: >60 ML/MIN/{1.73_M2}
GLUCOSE SERPL-MCNC: 117 MG/DL (ref 70–99)
HCT VFR BLD AUTO: 40.1 % (ref 40.5–52.5)
HGB BLD-MCNC: 13.6 G/DL (ref 13.5–17.5)
LYMPHOCYTES # BLD: 2.2 K/UL (ref 1–5.1)
LYMPHOCYTES NFR BLD: 39.9 %
MCH RBC QN AUTO: 29.9 PG (ref 26–34)
MCHC RBC AUTO-ENTMCNC: 33.8 G/DL (ref 31–36)
MCV RBC AUTO: 88.2 FL (ref 80–100)
MONOCYTES # BLD: 0.6 K/UL (ref 0–1.3)
MONOCYTES NFR BLD: 10.5 %
NEUTROPHILS # BLD: 2.5 K/UL (ref 1.7–7.7)
NEUTROPHILS NFR BLD: 44.1 %
PLATELET # BLD AUTO: 117 K/UL (ref 135–450)
PMV BLD AUTO: 9.4 FL (ref 5–10.5)
POTASSIUM SERPL-SCNC: 3.8 MMOL/L (ref 3.5–5.1)
POTASSIUM SERPL-SCNC: 3.8 MMOL/L (ref 3.5–5.1)
PROT SERPL-MCNC: 6.7 G/DL (ref 6.4–8.2)
RBC # BLD AUTO: 4.55 M/UL (ref 4.2–5.9)
SODIUM SERPL-SCNC: 136 MMOL/L (ref 136–145)
WBC # BLD AUTO: 5.6 K/UL (ref 4–11)

## 2024-01-09 PROCEDURE — 6370000000 HC RX 637 (ALT 250 FOR IP): Performed by: INTERNAL MEDICINE

## 2024-01-09 PROCEDURE — 6360000004 HC RX CONTRAST MEDICATION: Performed by: INTERNAL MEDICINE

## 2024-01-09 PROCEDURE — 6360000002 HC RX W HCPCS: Performed by: INTERNAL MEDICINE

## 2024-01-09 PROCEDURE — 2580000003 HC RX 258: Performed by: INTERNAL MEDICINE

## 2024-01-09 PROCEDURE — 85520 HEPARIN ASSAY: CPT

## 2024-01-09 PROCEDURE — 36415 COLL VENOUS BLD VENIPUNCTURE: CPT

## 2024-01-09 PROCEDURE — 93005 ELECTROCARDIOGRAM TRACING: CPT | Performed by: INTERNAL MEDICINE

## 2024-01-09 PROCEDURE — 93010 ELECTROCARDIOGRAM REPORT: CPT | Performed by: INTERNAL MEDICINE

## 2024-01-09 PROCEDURE — 80048 BASIC METABOLIC PNL TOTAL CA: CPT

## 2024-01-09 PROCEDURE — 2500000003 HC RX 250 WO HCPCS: Performed by: INTERNAL MEDICINE

## 2024-01-09 PROCEDURE — 85025 COMPLETE CBC W/AUTO DIFF WBC: CPT

## 2024-01-09 PROCEDURE — 94760 N-INVAS EAR/PLS OXIMETRY 1: CPT

## 2024-01-09 PROCEDURE — 99232 SBSQ HOSP IP/OBS MODERATE 35: CPT | Performed by: NURSE PRACTITIONER

## 2024-01-09 PROCEDURE — 80076 HEPATIC FUNCTION PANEL: CPT

## 2024-01-09 PROCEDURE — C8929 TTE W OR WO FOL WCON,DOPPLER: HCPCS

## 2024-01-09 RX ORDER — LISINOPRIL 20 MG/1
20 TABLET ORAL DAILY
Qty: 30 TABLET | Refills: 3 | Status: SHIPPED | OUTPATIENT
Start: 2024-01-10

## 2024-01-09 RX ORDER — NIFEDIPINE 30 MG/1
90 TABLET, EXTENDED RELEASE ORAL DAILY
Status: DISCONTINUED | OUTPATIENT
Start: 2024-01-09 | End: 2024-01-09 | Stop reason: HOSPADM

## 2024-01-09 RX ORDER — LISINOPRIL 20 MG/1
20 TABLET ORAL DAILY
Status: DISCONTINUED | OUTPATIENT
Start: 2024-01-09 | End: 2024-01-09 | Stop reason: HOSPADM

## 2024-01-09 RX ORDER — HYDROCHLOROTHIAZIDE 25 MG/1
25 TABLET ORAL DAILY
Qty: 30 TABLET | Refills: 3 | Status: SHIPPED | OUTPATIENT
Start: 2024-01-09

## 2024-01-09 RX ORDER — HYDROCHLOROTHIAZIDE 25 MG/1
25 TABLET ORAL DAILY
Status: DISCONTINUED | OUTPATIENT
Start: 2024-01-09 | End: 2024-01-09 | Stop reason: HOSPADM

## 2024-01-09 RX ORDER — METOPROLOL SUCCINATE 50 MG/1
50 TABLET, EXTENDED RELEASE ORAL DAILY
Status: DISCONTINUED | OUTPATIENT
Start: 2024-01-09 | End: 2024-01-09 | Stop reason: HOSPADM

## 2024-01-09 RX ADMIN — NIFEDIPINE 90 MG: 30 TABLET, EXTENDED RELEASE ORAL at 12:25

## 2024-01-09 RX ADMIN — MORPHINE SULFATE 2 MG: 2 INJECTION, SOLUTION INTRAMUSCULAR; INTRAVENOUS at 11:35

## 2024-01-09 RX ADMIN — APIXABAN 10 MG: 5 TABLET, FILM COATED ORAL at 07:42

## 2024-01-09 RX ADMIN — METOPROLOL SUCCINATE 50 MG: 50 TABLET, EXTENDED RELEASE ORAL at 14:31

## 2024-01-09 RX ADMIN — HYDROCHLOROTHIAZIDE 25 MG: 25 TABLET ORAL at 14:31

## 2024-01-09 RX ADMIN — LISINOPRIL 20 MG: 20 TABLET ORAL at 12:25

## 2024-01-09 RX ADMIN — HEPARIN SODIUM 1840 UNITS/HR: 10000 INJECTION, SOLUTION INTRAVENOUS at 06:56

## 2024-01-09 RX ADMIN — PERFLUTREN 1.5 ML: 6.52 INJECTION, SUSPENSION INTRAVENOUS at 08:16

## 2024-01-09 RX ADMIN — Medication 10 ML: at 07:42

## 2024-01-09 ASSESSMENT — PAIN DESCRIPTION - ORIENTATION: ORIENTATION: RIGHT

## 2024-01-09 ASSESSMENT — PAIN DESCRIPTION - DESCRIPTORS: DESCRIPTORS: ACHING

## 2024-01-09 ASSESSMENT — PAIN SCALES - GENERAL: PAINLEVEL_OUTOF10: 8

## 2024-01-09 ASSESSMENT — PAIN DESCRIPTION - LOCATION: LOCATION: KNEE

## 2024-01-09 NOTE — PLAN OF CARE
Problem: Discharge Planning  Goal: Discharge to home or other facility with appropriate resources  Outcome: Progressing     Problem: ABCDS Injury Assessment  Goal: Absence of physical injury  Outcome: Progressing     Problem: Neurosensory - Adult  Goal: Achieves stable or improved neurological status  Outcome: Progressing  Goal: Achieves maximal functionality and self care  Outcome: Progressing

## 2024-01-09 NOTE — DISCHARGE SUMMARY
[facility]           [address] [place] [address]                               DISCHARGE SUMMARY    PATIENT NAME: CEDRICK VUONG                     :        1965  MED REC NO:   [id number]                          ROOM:  ACCOUNT NO:   [id number]                           ADMIT DATE: 2024  PROVIDER:     David Finnegan MD                  DISCHARGE DATE:  2024    Patient of Dr. Finnegan.    FINAL DIAGNOSES:  1.  Saddle pulmonary embolus.  2.  Multiple pulmonary emboli.  3.  DVT, right calf.  4.  Acute hypoxic respiratory failure.  5.  Hypertension.    HISTORY:  The patient was a generally healthy 58-year-old    .  On the evening of admission, the patient  had the acute onset of shortness of breath.  No chest pain.  The patient  had been having right calf pain that he felt was musculoskeletal strain  for about a week prior to this admission.  Emergency room evaluation  revealed hypoxemia.  CT of the chest showed a saddle pulmonary embolus  with multiple other peripheral emboli.  There was slight enlargement of  the right ventricle.  CBC and chemistry profile were okay.  The patient  was diagnosed in the emergency room with multiple pulmonary emboli as  well as acute hypoxic respiratory failure.  He was bolused with heparin,  begun on heparin drip, given high-flow nasal oxygen supplementation.   Dr. Navarro of Cardiology was consulted.  I was called and admitted the  patient to PCU with Cardiology consultation.    HOSPITAL COURSE:  The patient's hospital course was characterized by  improvement.  He was treated overnight with high-flow oxygen and  continuous intravenous heparin.  He was seen by Dr. Navarro.  On  2024, the patient was taken to the cath lab and underwent  mechanical removal of the pulmonary emboli.  Following this procedure.   The patient was much improved.  Pulse ox was 95%

## 2024-01-09 NOTE — PLAN OF CARE
Problem: Discharge Planning  Goal: Discharge to home or other facility with appropriate resources  1/9/2024 1401 by Myra Park RN  Outcome: Adequate for Discharge  1/9/2024 1112 by Myra Park RN  Outcome: Progressing     Problem: ABCDS Injury Assessment  Goal: Absence of physical injury  1/9/2024 1401 by Myra Park RN  Outcome: Adequate for Discharge  1/9/2024 1112 by Myra Park RN  Outcome: Progressing     Problem: Neurosensory - Adult  Goal: Achieves stable or improved neurological status  1/9/2024 1401 by Myra Park RN  Outcome: Adequate for Discharge  1/9/2024 1112 by Myra Park RN  Outcome: Progressing  Goal: Achieves maximal functionality and self care  1/9/2024 1401 by Myra Park RN  Outcome: Adequate for Discharge  1/9/2024 1112 by Myra Park RN  Outcome: Progressing     Problem: Neurosensory - Adult  Goal: Achieves maximal functionality and self care  1/9/2024 1401 by Myra Park, RN  Outcome: Adequate for Discharge  1/9/2024 1112 by Myra Park, RN  Outcome: Progressing

## 2024-01-09 NOTE — CARE COORDINATION
Case Management Discharge Note          Date / Time of Note: 1/9/2024 2:46 PM                  Patient Name: Jemal Oglesby   YOB: 1965  Diagnosis: Hypoxia [R09.02]  Bilateral pulmonary embolism (HCC) [I26.99]  Leg edema, right [R60.0]  Acute saddle pulmonary embolism without acute cor pulmonale (HCC) [I26.92]  Dyspnea, unspecified type [R06.00]  Multiple subsegmental pulmonary emboli without acute cor pulmonale (HCC) [I26.94]   Date / Time: 1/7/2024  8:36 PM    Financial:  Payor: [employer] / Plan: [employer] - CHOICE PLU / Product Type: *No Product type* /      Pharmacy:    [id number] [address] [facility] [phone] -  [phone]  [address] [facility] [address]  Phone: [phone] Fax: [phone]      Assistance purchasing medications?: Potential Assistance Purchasing Medications: No  Assistance provided by Case Management: None at this time    DISCHARGE Disposition: Home- No Services Needed    Transportation:  Transportation PLAN for discharge: family   Mode of Transport: Private Car  Time of Transport: after 3pm    Additional CM Notes: Met with patient & wife. Patient will return home independently. Denies home needs. Wife transport.    Christina Mccormick RN  [facility]   Case Management Department  Ph: [phone]    
discharge plan with any other family members/significant others, and if so, who? Yes  Plans to Return to Present Housing: Yes  Other Identified Issues/Barriers to RETURNING to current housing: no barriers  Potential Assistance needed at discharge: N/A            Potential DME:    Patient expects to discharge to: House  Plan for transportation at discharge: Family    Financial    Payor: [employer] / Plan: [employer] - CHOICE PLU / Product Type: *No Product type* /     Does insurance require precert for SNF: Yes    Potential assistance Purchasing Medications: No  Meds-to-Beds request:        [facility] [id number] - [place], [place] - [address] [facility] [phone] - F [phone]  [address] [facility] [address]  Phone: [phone] Fax: [phone]      Notes:    Factors facilitating achievement of predicted outcomes: Family support, Cooperative, and Pleasant    Barriers to discharge: No barriers    Additional Case Management Notes: Patient will return home independently. No dc needs.    The Plan for Transition of Care is related to the following treatment goals of Hypoxia [R09.02]  Bilateral pulmonary embolism (HCC) [I26.99]  Leg edema, right [R60.0]  Acute saddle pulmonary embolism without acute cor pulmonale (HCC) [I26.92]  Dyspnea, unspecified type [R06.00]  Multiple subsegmental pulmonary emboli without acute cor pulmonale (HCC) [I26.94]    Christina Mccormick RN  Case Management Department  Ph: [phone]

## 2024-01-09 NOTE — DISCHARGE INSTR - DIET
Good nutrition is important when healing from an illness, injury, or surgery.  Follow any nutrition recommendations given to you during your hospital stay.   If you were given an oral nutrition supplement while in the hospital, continue to take this supplement at home.  You can take it with meals, in-between meals, and/or before bedtime. These supplements can be purchased at most local grocery stores, pharmacies, and chain [employer]-stores.   If you have any questions about your diet or nutrition, call the hospital and ask for the dietitian.  NO ADDED SALT

## 2024-01-09 NOTE — PROGRESS NOTES
[facility]  Respiratory Therapy Education- EKG      Name:  Jemal Oglesby  Medical Record Number:  [id number]  Age: 58 y.o.   : 1965  Today's Date:  2024  Room:  F9Q-9001/5268-01        Modality:  EKG      Patient/caregiver was educated on procedure:    Yes        Level of patient/caregiver understanding able to:  Verbalize understanding     Education status:   Provided instructions on procedure/ indications      Response to education:    Very Good     Time Spent with patient: 5  minutes         Electronically signed by Swetha Anne RCP on 2024 at 1:55 AM             
      [facility]   Daily Progress Note      Admit Date:  1/7/2024    CC: SOB    HPI:   Jemal Oglesby is a 58 y.o. male with PMH HTN.    Presented to [facility] with progressively worsening SOB X1 week and R calf pain.   Chest CTA revealed saddle PE. He was hemodynamically stable.  Underwent pulm angiogram/thrombectomy w Dr. Navarro.    Ambulates in room.  SOB resolved, Denies CP, LH, dizziness, syncope    C/O knee pain.    Review of Systems:   General: Denies fever, chills, fatigue, weakness  Skin: Denies skin changes, rash, itching, lesions.  HEENT: Denies headache, dizziness, vision changes, nosebleeds, sore throat, nasal drainage  RESP: Denies cough, sputum, dyspnea, wheeze, snoring  CARD: Denies palpitations,  murmur  GI:Denies nausea, vomiting, heartburn, loss of appetite, change in bowels  : Denies frequency, pain, incontinence, polyuria  VASC: Denies claudication, leg cramps, clots  MUSC/SKEL: Denies pain, stiffness, arthritis  PSYCH: Denies anxiety, depression, stress  NEURO: Denies numbness, tingling, weakness,change in mood or memory  HEME: Denies abn bruising, bleeding, anemia  ENDO: Denies intolerance to heat, cold, excessive thirst or hunger, hx thyroid disease    Objective:   /83   Pulse 69   Temp 98 °F (36.7 °C) (Oral)   Resp 16   Ht 1.854 m (6' 1\")   Wt 127.5 kg (281 lb 1.4 oz)   SpO2 98%   BMI 37.08 kg/m²         Intake/Output Summary (Last 24 hours) at 1/9/2024 1258  Last data filed at 1/9/2024 0814  Gross per 24 hour   Intake 1121.52 ml   Output 625 ml   Net 496.52 ml     I/O since adm:     WEIGHT:Admit Weight - Scale: 126.6 kg (279 lb)         Today  Weight - Scale: 127.5 kg (281 lb 1.4 oz)   DRY WEIGHT:  Wt Readings from Last 3 Encounters:   01/09/24 127.5 kg (281 lb 1.4 oz)   04/26/22 123.3 kg (271 lb 13.2 oz)       Physical Exam:  GEN: Appears well, no acute distress  SKIN: Brown, warm, dry.   HEENT: PERRLA.  No adenopathy.  LUNG: AP diameter normal. Clear bilateral. No 
Clinical Pharmacy Note  Heparin Dosing       Lab Results   Component Value Date/Time    APTT 27.1 01/07/2024 09:59 PM     Lab Results   Component Value Date/Time    HGB 13.6 01/09/2024 04:29 AM    HCT 40.1 01/09/2024 04:29 AM     01/09/2024 04:29 AM    INR 1.10 01/07/2024 09:59 PM       Current Infusion Rate: 1840 units/hr  Current anti-Xa level 0.63    Plan:  Rate: continue at 1840 units/hr  Next anti-Xa level: not ordered d/t drip scheduled to stop at 0800 (transitioning to eliquis)    Pharmacy will continue to monitor and adjust based on anti-Xa results.  Adalgisa Banegas RPH, 1/9/2024 6:32 AM    
Clinical Pharmacy Note  Heparin Dosing       Lab Results   Component Value Date/Time    APTT 27.1 01/07/2024 09:59 PM     Lab Results   Component Value Date/Time    HGB 14.7 01/08/2024 05:51 AM    HCT 43.1 01/08/2024 05:51 AM     01/08/2024 05:51 AM    INR 1.10 01/07/2024 09:59 PM       Current Infusion Rate: 1720  units/hr  Current anti-Xa level 0.21    Plan:  Bolus: 5000 units  Rate: increase to 1970 units/hr  Next anti-Xa level: 2100 1/8/24    Pharmacy will continue to monitor and adjust based on anti-Xa results.  Iman Horner [facility], 1/8/2024 2:32 PM    
Clinical Pharmacy Note  Heparin Dosing       Lab Results   Component Value Date/Time    APTT 27.1 01/07/2024 09:59 PM     Lab Results   Component Value Date/Time    HGB 14.7 01/08/2024 05:51 AM    HCT 43.1 01/08/2024 05:51 AM     01/08/2024 05:51 AM    INR 1.10 01/07/2024 09:59 PM       Current Infusion Rate: 1970 units/hr  Current anti-Xa level 1.04    Plan:  Hold x 1 hour  Rate: decrease to 1840 units/hr  Next anti-Xa level: 0530 on 1/9/24    Pharmacy will continue to monitor and adjust based on anti-Xa results.  Adalgisa Banegas RPH, 1/8/2024 10:17 PM    
Clinical Pharmacy Note  Heparin Dosing       Lab Results   Component Value Date/Time    APTT 27.1 01/07/2024 09:59 PM     Lab Results   Component Value Date/Time    HGB 14.7 01/08/2024 05:51 AM    HCT 43.1 01/08/2024 05:51 AM     01/08/2024 05:51 AM    INR 1.10 01/07/2024 09:59 PM     antiXa 1.24  Current Infusion Rate: 2100  units/hr    Plan:  HOLD for 1 hour  Rate: reduce to 1720 units/hr  Next anti-Xa level: 1330 1/8    Pharmacy will continue to monitor and adjust based on anti-Xa results.  Oliver Cervantes [facility], 1/8/2024 6:38 AM    
Clinical Pharmacy Note  Medication Counseling    Reviewed new medications started during hospital admission: eliquis, , . Indications and side effects were emphasized during counseling.  All medication-related questions addressed.  Patient verbalized understanding of education.    Should the patient express any additional questions or concerns regarding their medications, please do not hesitate to contact the pharmacy department.    Patient/caregiver aware they may refuse medications during hospital stay.       10 minutes spent educating patient regarding medications.   
Discharge orders acknowledged by RN . Discharge teaching completed with pt and family. AVS reviewed and all questions answered. Medication regimen reviewed and pt understands schedule. Follow up appointments also reviewed with pt and resources given for discharge. Pt picked up prescription from retail pharmacy. IVs removed. Bedside monitor removed from pt. 60+ minutes of education completed. Required core measures completed. Pt vitals WDL. Pt discharged with all belongings to home with wife. Pt transported off of unit via wheelchair. No complications.      Electronically signed by Myra Park RN on 1/9/2024 at 4:02 PM    
Discharge orders acknowledged. Waiting on Eliquis from Retail Pharmacy.    Electronically signed by Myra Park RN on 1/9/2024 at 3:13 PM    
Evaluation requested for pulmonary embolism  CT reviewed   Hemodynamically stable   All biomarkers negative   No indication for intervention at this time  Agree with heparin gtt  Echo/venous dopplers in AM     Full consult to follow    Mina Navarro MD FACC   General, Interventional Cardiology, and Peripheral Vascular Disease   [facility]   (O): [phone]  (F): [phone]    
Received from cath lab post procedure, awake and alert, denies pain, now on room air, right femoral vein sheath intact, instructions given    0915 right femoral sheath discontinued, pressure held 4 10 minutes with Quik Clot, soft, no bleeding or hematoma.    0935 patient lifted right leg, bleeding from site, pressure held for 10 minutes with Quik Clot, no further bleeding    1045 Right groin site stable, remains bedrest until 1130  Report given to Myra SMITH, transferred to [facility] via bed      
Problems:    * No resolved hospital problems. *    Blood pressure 130/81, pulse 76, temperature 98.5 °F (36.9 °C), temperature source Oral, resp. rate 20, height 1.854 m (6' 1\"), weight 127.5 kg (281 lb 1.4 oz), SpO2 95 %.    Subjective Feels better, NO CP or SOB. Not hypoxic  Objective:  Vital signs: (most recent): Blood pressure 130/81, pulse 76, temperature 98.5 °F (36.9 °C), temperature source Oral, resp. rate 20, height 1.854 m (6' 1\"), weight 127.5 kg (281 lb 1.4 oz), SpO2 95 %.    A&O, CTPA, RR&R, Dressing R groin, no edema.  Assessment & Plan TTE pending. On Eliquis. D/C per Cardiology. D/W patient.    David Finnegan MD  1/9/2024

## 2024-01-10 ENCOUNTER — TELEPHONE (OUTPATIENT)
Dept: CARDIOLOGY CLINIC | Age: 59
End: 2024-01-10

## 2024-01-10 NOTE — TELEPHONE ENCOUNTER
Central Scheduling called to verify the Echo stress test. Patient states he already had this completed. Per his chart, he only had an Echo.    Please advise if patient will need this test as well.    Jemal' callback: [phone]

## 2024-01-10 NOTE — TELEPHONE ENCOUNTER
Noted, thank you.   Please have patient follow up with PCP or ortho specialist in regards to pain medication for knee issues. Thanks

## 2024-01-10 NOTE — TELEPHONE ENCOUNTER
Yes, per Dr. Navarro echo exercise stress test is still needed.  This is a different test than already completed.  Please clarify with patient to get scheduled. Thanks

## 2024-01-10 NOTE — TELEPHONE ENCOUNTER
Patient scheduled for 4/9/24 at 10:30am.    Elio is requesting pain meds for his knee.    Please send to local pharmacy:   [facility] [id number] [place] [id number] [place] [phone] - F [phone] (Pharmacy)

## 2024-01-15 ENCOUNTER — TELEPHONE (OUTPATIENT)
Dept: CARDIOLOGY CLINIC | Age: 59
End: 2024-01-15

## 2024-01-15 DIAGNOSIS — I26.92 SADDLE EMBOLUS OF PULMONARY ARTERY WITHOUT ACUTE COR PULMONALE, UNSPECIFIED CHRONICITY (HCC): Primary | ICD-10-CM

## 2024-01-15 NOTE — TELEPHONE ENCOUNTER
Patient had Thrombectomy for PE at [facility] on 1/8/24. Patient has follow up with Dr. Navarro on 2/6/24. Patient needs Limited Echo for RV size and function. Stress Echo entered in error then later verified in discussion with MD that stress echo was still needed.    I spoke with Dr. Navarro to clarify testing. Patient needs Limited Echo for RV size and function.     UPDATE 1/16/24  Spoke to Racquel in Echo, states patient can get Limited Echo on 2/14/24 at 0930, then he could see Dr. Navarro on 2/19. Called and LM for patient to return call.    Patient returned call, I reviewed the plan with him:  Cancelled f/u with Dr. Navarro 2/6/2024  Scheduled Limited Echo for  2/14/24 at 0930 [facility]  Scheduled follow up with Dr. Navarro for 2/19/24 at 1015  Cancelled Stress Echo that was scheduled for 4/9/24    He verbalized understanding, instructed to call for questions or concerns.

## 2024-02-13 NOTE — PROGRESS NOTES
Interventional Cardiology Consultation     Jemal Oglesby  1965    PCP: David Finnegan MD  Referring Physician: [facility]   Reason for Referral: Pulmonary embolism  Chief Complaint: \"I am  Chief Complaint   Patient presents with    Follow-Up from Hospital       Subjective:     History of Present Illness: The patient is 58 y.o. male with a past medical history significant for hypertension presents with above complaint.  He admits to 1 week of worsening shortness of breath.  He also admits to right calf discomfort.  He is a  with no history of similar complaints.  Due to his worsening shortness of breath he presented to the emergency was Hospital for further workup.  CT pulm angiogram is consistent with a saddle pulm embolism, s/p successful thrombectomy 1/7/2024. Patient reports overall he is feeling good. States he is going back to work tomorrow. Denies any shortness of breath or chest pain. Reports compliance with all medications. Denies any abnormal bruising or bleeding.      No past medical history on file.  No past surgical history on file.  No family history on file.  Social History     Tobacco Use    Smoking status: Never    Smokeless tobacco: Never   Substance Use Topics    Alcohol use: Yes     Comment: social    Drug use: Never       No Known Allergies    Current Outpatient Medications   Medication Sig Dispense Refill    apixaban (ELIQUIS) 5 MG TABS tablet Take 1 tablet by mouth 2 times daily 180 tablet 2    lisinopril-hydroCHLOROthiazide (PRINZIDE;ZESTORETIC) 20-25 MG per tablet Take 1 tablet by mouth daily      metoprolol succinate (TOPROL XL) 50 MG extended release tablet Take 1 tablet by mouth daily      NIFEdipine (ADALAT CC) 90 MG extended release tablet Take 1 tablet by mouth daily      hydroCHLOROthiazide (HYDRODIURIL) 25 MG tablet Take 1 tablet by mouth daily (Patient not taking: Reported on 2/19/2024) 30 tablet 3    lisinopril (PRINIVIL;ZESTRIL) 20 MG tablet

## 2024-02-14 ENCOUNTER — HOSPITAL ENCOUNTER (OUTPATIENT)
Dept: NON INVASIVE DIAGNOSTICS | Age: 59
Discharge: HOME | End: 2024-02-14
Attending: INTERNAL MEDICINE
Payer: COMMERCIAL

## 2024-02-14 DIAGNOSIS — I26.92 SADDLE EMBOLUS OF PULMONARY ARTERY WITHOUT ACUTE COR PULMONALE, UNSPECIFIED CHRONICITY (HCC): ICD-10-CM

## 2024-02-14 PROCEDURE — 93308 TTE F-UP OR LMTD: CPT

## 2024-02-19 ENCOUNTER — OFFICE VISIT (OUTPATIENT)
Dept: CARDIOLOGY CLINIC | Age: 59
End: 2024-02-19
Payer: COMMERCIAL

## 2024-02-19 ENCOUNTER — TELEPHONE (OUTPATIENT)
Dept: CARDIOLOGY CLINIC | Age: 59
End: 2024-02-19

## 2024-02-19 VITALS
SYSTOLIC BLOOD PRESSURE: 132 MMHG | HEART RATE: 72 BPM | HEIGHT: 73 IN | DIASTOLIC BLOOD PRESSURE: 78 MMHG | OXYGEN SATURATION: 96 % | WEIGHT: 286.4 LBS | BODY MASS INDEX: 37.96 KG/M2

## 2024-02-19 DIAGNOSIS — I26.92 ACUTE SADDLE PULMONARY EMBOLISM WITHOUT ACUTE COR PULMONALE (HCC): Primary | ICD-10-CM

## 2024-02-19 PROCEDURE — G8427 DOCREV CUR MEDS BY ELIG CLIN: HCPCS | Performed by: INTERNAL MEDICINE

## 2024-02-19 PROCEDURE — 99214 OFFICE O/P EST MOD 30 MIN: CPT | Performed by: INTERNAL MEDICINE

## 2024-02-19 PROCEDURE — 3017F COLORECTAL CA SCREEN DOC REV: CPT | Performed by: INTERNAL MEDICINE

## 2024-02-19 PROCEDURE — 1036F TOBACCO NON-USER: CPT | Performed by: INTERNAL MEDICINE

## 2024-02-19 PROCEDURE — G8484 FLU IMMUNIZE NO ADMIN: HCPCS | Performed by: INTERNAL MEDICINE

## 2024-02-19 PROCEDURE — G8417 CALC BMI ABV UP PARAM F/U: HCPCS | Performed by: INTERNAL MEDICINE

## 2024-02-19 NOTE — TELEPHONE ENCOUNTER
Jemal called into the office to inquire if he's able to get a return to work note for being seen today. Jemal states note should include that he was seen and allowed to return to work without any restrictions.    Please advise.    Jemal's callback: [phone].

## 2024-02-21 NOTE — TELEPHONE ENCOUNTER
Jemal stop in to drop off more paperwork ([employer]) FOR  to fill out. I place them in the NURSE/EDWINA folder.      Jemal can be reached at [phone]

## 2024-08-15 NOTE — PROGRESS NOTES
Interventional Cardiology Consultation     Jemal Oglesby  1965    PCP: David Finnegan MD  Referring Physician: [facility]   Reason for Referral: Pulmonary embolism  Chief Complaint: \"I am GOOD\"  Chief Complaint   Patient presents with    Follow-up     NO CC       Subjective:     History of Present Illness: The patient is 58 y.o. male with a past medical history significant for hypertension presents with above complaint.  He admits to 1 week of worsening shortness of breath.  He also admits to right calf discomfort.  He is a  with no history of similar complaints.  Due to his worsening shortness of breath he presented to the emergency was Hospital for further workup.  CT pulm angiogram is consistent with a saddle pulm embolism, s/p successful thrombectomy 1/7/2024. Patient here today for follow up. Reports overall he is doing good. Staying active, golfing. Reports compliance with Eliquis, denies any abnormal bruising or bleeding. Blood pressure elevated on today's visit. States he stopped taking his BP medications last week to see if he could control with diet and exercise.     No past medical history on file.  No past surgical history on file.  No family history on file.  Social History     Tobacco Use    Smoking status: Never    Smokeless tobacco: Never   Substance Use Topics    Alcohol use: Yes     Comment: social    Drug use: Never       No Known Allergies    Current Outpatient Medications   Medication Sig Dispense Refill    apixaban (ELIQUIS) 5 MG TABS tablet Take 1 tablet by mouth 2 times daily 180 tablet 2    hydroCHLOROthiazide (HYDRODIURIL) 25 MG tablet Take 1 tablet by mouth daily 30 tablet 3    lisinopril (PRINIVIL;ZESTRIL) 20 MG tablet Take 1 tablet by mouth daily 30 tablet 3    lisinopril-hydroCHLOROthiazide (PRINZIDE;ZESTORETIC) 20-25 MG per tablet Take 1 tablet by mouth daily      metoprolol succinate (TOPROL XL) 50 MG extended release tablet Take 1 tablet by

## 2024-08-19 ENCOUNTER — OFFICE VISIT (OUTPATIENT)
Dept: CARDIOLOGY CLINIC | Age: 59
End: 2024-08-19
Payer: COMMERCIAL

## 2024-08-19 VITALS
OXYGEN SATURATION: 99 % | BODY MASS INDEX: 36.6 KG/M2 | WEIGHT: 276.2 LBS | HEART RATE: 74 BPM | DIASTOLIC BLOOD PRESSURE: 120 MMHG | HEIGHT: 73 IN | SYSTOLIC BLOOD PRESSURE: 220 MMHG

## 2024-08-19 DIAGNOSIS — I26.99 BILATERAL PULMONARY EMBOLISM (HCC): Primary | ICD-10-CM

## 2024-08-19 DIAGNOSIS — I10 PRIMARY HYPERTENSION: ICD-10-CM

## 2024-08-19 PROCEDURE — G8417 CALC BMI ABV UP PARAM F/U: HCPCS | Performed by: INTERNAL MEDICINE

## 2024-08-19 PROCEDURE — 99213 OFFICE O/P EST LOW 20 MIN: CPT | Performed by: INTERNAL MEDICINE

## 2024-08-19 PROCEDURE — 3080F DIAST BP >= 90 MM HG: CPT | Performed by: INTERNAL MEDICINE

## 2024-08-19 PROCEDURE — G8427 DOCREV CUR MEDS BY ELIG CLIN: HCPCS | Performed by: INTERNAL MEDICINE

## 2024-08-19 PROCEDURE — 3077F SYST BP >= 140 MM HG: CPT | Performed by: INTERNAL MEDICINE

## 2024-08-19 PROCEDURE — 3017F COLORECTAL CA SCREEN DOC REV: CPT | Performed by: INTERNAL MEDICINE

## 2024-08-19 PROCEDURE — 1036F TOBACCO NON-USER: CPT | Performed by: INTERNAL MEDICINE
